# Patient Record
Sex: MALE | Race: WHITE | NOT HISPANIC OR LATINO | Employment: FULL TIME | URBAN - METROPOLITAN AREA
[De-identification: names, ages, dates, MRNs, and addresses within clinical notes are randomized per-mention and may not be internally consistent; named-entity substitution may affect disease eponyms.]

---

## 2020-09-30 ENCOUNTER — OFFICE VISIT (OUTPATIENT)
Dept: DERMATOLOGY | Facility: CLINIC | Age: 25
End: 2020-09-30
Payer: COMMERCIAL

## 2020-09-30 DIAGNOSIS — D22.9 MULTIPLE NEVI: ICD-10-CM

## 2020-09-30 DIAGNOSIS — Z12.83 SKIN EXAM FOR MALIGNANT NEOPLASM: Primary | ICD-10-CM

## 2020-09-30 DIAGNOSIS — D18.01 CHERRY ANGIOMA: ICD-10-CM

## 2020-09-30 DIAGNOSIS — L81.2 FRECKLES: ICD-10-CM

## 2020-09-30 RX ORDER — CAFFEINE 200 MG
200 TABLET ORAL PRN
COMMUNITY
End: 2023-05-08

## 2020-09-30 ASSESSMENT — PAIN SCALES - GENERAL: PAINLEVEL: NO PAIN (0)

## 2020-09-30 NOTE — LETTER
9/30/2020       RE: [address]  [id number]  [facility] [address]     Dear Colleague,    Thank you for referring your patient, Shoaib Sidhu, to the [facility] DERMATOLOGY at [facility]. Please see a copy of my visit note below.    [facility] Dermatology Note    Dermatology Problem List:  1. Multiple benign nevi.  - Photo 9/30/2020     Encounter Date: Sep 30, 2020    CC:   Chief Complaint   Patient presents with     Skin Check     Shoaib is here today for a skin check- Shoaib notes some areas of concern.          History of Present Illness:  Mr. Shoaib Sidhu is a 25 year old male with past dermatologic history listed above who presents in self referral for further evaluation of two spots. Spot on his right shoulder was first noticed a week ago when it had a red rim. Thought to be due to playing Pandorama. Denies any pain, pruritus, or bleeding. The other spot is on his mid central back. Been present for a long time. Denies any pain, pruritus, or bleeding    Of note, works at [employer]). Denies any personal history of skin cancer. Believes that his grandmother had one. Wears sunscreen better than as child. Denies any immunosuppression or tanning bed use.     Otherwise feeling well, no additional skin concerns.     Past Medical History:   There is no problem list on file for this patient.    History reviewed. No pertinent past medical history.  History reviewed. No pertinent surgical history.    Social History:  Patient  reports that he has never smoked. He has never used smokeless tobacco.    Family History:  Family History   Problem Relation Age of Onset     Skin Cancer Paternal Grandmother        Medications:  Current Outpatient Medications   Medication Sig Dispense Refill     caffeine (NO-DOZE) 200 MG TABS tablet Take 200 mg by mouth as needed          No Known Allergies    Review of Systems:  - As per HPI  -  Constitutional: Otherwise feeling well today, in usual state of health.  - Skin: As above in HPI. No additional skin concerns.    Physical exam:  GEN: This is a well developed, well-nourished male in no acute distress, in a pleasant mood.    SKIN: Total skin, which includes the head/face, both arms, chest, back, abdomen,both legs,  digits and/or nails, was examined.  - Reynold I.  - There are dome shaped bright red papules on the right posterior shoulder, arms, and chest.   - 7mm, regular reddish-brown macule on the mid central back. Dermoscopy reassuring.  - Scattered brown macules on sun exposed areas.  - No other lesions of concern on areas examined.         In office labs or procedures performed today:   None    Impression/Plan:  1. Multiple clinically banal appearing nevi  2. Freckles  - ABCDs of melanoma were discussed and self skin checks were advised. Sun precaution was advised including the use of sun screens of SPF 30 or higher, sun protective clothing, and avoidance of tanning beds.  - Took photo of larger nevus on back which appears very reassuring and uniform today    3. Cherry angioma(s). Discussed the natural history and benign nature of this lesion. Reassurance provided that no additional treatment is necessary.     Follow-up in 1-2 years, earlier for new or changing lesions.     Staff Involved:  Patient was seen and staffed with attending physician Dr. Mauricio Chinchilla MD  Med/Derm Resident PGY-3  P:[phone]    Staff Addendum:  Staff Physician Comments:   I saw and evaluated the patient with the resident and I agree with the assessment and plan.  I was present for the examination.    Paulette Martínez MD    Department of Dermatology  [facility]: Phone: [phone], Fax: [phone]  9/30/2020     Again, thank you for allowing me to participate in the care of your patient.       Sincerely,    Paulette Martínez MD

## 2020-09-30 NOTE — NURSING NOTE
Dermatology Rooming Note    Shoaib Sidhu's goals for this visit include:   Chief Complaint   Patient presents with     Skin Check     Shoaib is here today for a skin check- Shoaib notes some areas of concern.      BOSTON Nogueira

## 2020-09-30 NOTE — PROGRESS NOTES
[facility] Dermatology Note    Dermatology Problem List:  1. Multiple benign nevi.  - Photo 9/30/2020     Encounter Date: Sep 30, 2020    CC:   Chief Complaint   Patient presents with     Skin Check     Shoaib is here today for a skin check- Shoaib notes some areas of concern.          History of Present Illness:  Mr. Shoaib Sidhu is a 25 year old male with past dermatologic history listed above who presents in self referral for further evaluation of two spots. Spot on his right shoulder was first noticed a week ago when it had a red rim. Thought to be due to playing [employer]. Denies any pain, pruritus, or bleeding. The other spot is on his mid central back. Been present for a long time. Denies any pain, pruritus, or bleeding    Of note, works at [employer]). Denies any personal history of skin cancer. Believes that his grandmother had one. Wears sunscreen better than as child. Denies any immunosuppression or tanning bed use.     Otherwise feeling well, no additional skin concerns.     Past Medical History:   There is no problem list on file for this patient.    History reviewed. No pertinent past medical history.  History reviewed. No pertinent surgical history.    Social History:  Patient  reports that he has never smoked. He has never used smokeless tobacco.    Family History:  Family History   Problem Relation Age of Onset     Skin Cancer Paternal Grandmother        Medications:  Current Outpatient Medications   Medication Sig Dispense Refill     caffeine (NO-DOZE) 200 MG TABS tablet Take 200 mg by mouth as needed          No Known Allergies    Review of Systems:  - As per HPI  - Constitutional: Otherwise feeling well today, in usual state of health.  - Skin: As above in HPI. No additional skin concerns.    Physical exam:  GEN: This is a well developed, well-nourished male in no acute distress, in a pleasant mood.    SKIN: Total skin, which includes the head/face, both  arms, chest, back, abdomen,both legs,  digits and/or nails, was examined.  - Reynold I.  - There are dome shaped bright red papules on the right posterior shoulder, arms, and chest.   - 7mm, regular reddish-brown macule on the mid central back. Dermoscopy reassuring.  - Scattered brown macules on sun exposed areas.  - No other lesions of concern on areas examined.         In office labs or procedures performed today:   None    Impression/Plan:  1. Multiple clinically banal appearing nevi  2. Freckles  - ABCDs of melanoma were discussed and self skin checks were advised. Sun precaution was advised including the use of sun screens of SPF 30 or higher, sun protective clothing, and avoidance of tanning beds.  - Took photo of larger nevus on back which appears very reassuring and uniform today    3. Cherry angioma(s). Discussed the natural history and benign nature of this lesion. Reassurance provided that no additional treatment is necessary.     Follow-up in 1-2 years, earlier for new or changing lesions.     Staff Involved:  Patient was seen and staffed with attending physician Dr. Mauricio Chinchilla MD  Med/Derm Resident PGY-3  P:[phone]    Staff Addendum:  Staff Physician Comments:   I saw and evaluated the patient with the resident and I agree with the assessment and plan.  I was present for the examination.    Paulette Martínez MD    Department of Dermatology  [facility] [place]: Phone: [phone], Fax: [phone]  9/30/2020

## 2020-09-30 NOTE — LETTER
Date:October 3, 2020      Patient was self referred, no letter generated. Do not send.        [facility] Health Information

## 2020-09-30 NOTE — PATIENT INSTRUCTIONS
The ABCDEs of Melanoma  Asymmetry, Border (irregularity), Color (not uniform, changes in color), Diameter (greater than 6 mm which is about the size of a pencil eraser), and Evolving (any changes in preexisting moles)    Skin cancer can develop anywhere on the skin. Ask someone for help when checking your skin, especially in hard to see places. If you notice a mole different from others, or that changes, enlarges, itches, or bleeds (even if it is small), you should see a dermatologist.

## 2023-05-08 ENCOUNTER — OFFICE VISIT (OUTPATIENT)
Dept: FAMILY MEDICINE | Facility: CLINIC | Age: 28
End: 2023-05-08
Payer: COMMERCIAL

## 2023-05-08 VITALS
SYSTOLIC BLOOD PRESSURE: 126 MMHG | WEIGHT: 174 LBS | BODY MASS INDEX: 24.91 KG/M2 | HEIGHT: 70 IN | OXYGEN SATURATION: 98 % | RESPIRATION RATE: 10 BRPM | TEMPERATURE: 98.9 F | DIASTOLIC BLOOD PRESSURE: 83 MMHG | HEART RATE: 67 BPM

## 2023-05-08 DIAGNOSIS — J02.9 SORE THROAT: ICD-10-CM

## 2023-05-08 DIAGNOSIS — Z13.220 LIPID SCREENING: ICD-10-CM

## 2023-05-08 DIAGNOSIS — Z00.00 ANNUAL PHYSICAL EXAM: Primary | ICD-10-CM

## 2023-05-08 DIAGNOSIS — F41.9 ANXIETY: ICD-10-CM

## 2023-05-08 PROBLEM — D17.30 LIPOMA OF SKIN AND SUBCUTANEOUS TISSUE: Status: ACTIVE | Noted: 2023-05-08

## 2023-05-08 LAB
DEPRECATED S PYO AG THROAT QL EIA: NEGATIVE
GROUP A STREP BY PCR: NOT DETECTED

## 2023-05-08 PROCEDURE — 87651 STREP A DNA AMP PROBE: CPT | Performed by: FAMILY MEDICINE

## 2023-05-08 PROCEDURE — 36415 COLL VENOUS BLD VENIPUNCTURE: CPT | Performed by: FAMILY MEDICINE

## 2023-05-08 PROCEDURE — 99213 OFFICE O/P EST LOW 20 MIN: CPT | Mod: 25 | Performed by: FAMILY MEDICINE

## 2023-05-08 PROCEDURE — 99385 PREV VISIT NEW AGE 18-39: CPT | Performed by: FAMILY MEDICINE

## 2023-05-08 PROCEDURE — 80061 LIPID PANEL: CPT | Performed by: FAMILY MEDICINE

## 2023-05-08 ASSESSMENT — PAIN SCALES - GENERAL: PAINLEVEL: MILD PAIN (2)

## 2023-05-08 NOTE — PROGRESS NOTES
"Assessment/Plan    Preventive.  See below orders for screening tests and immunizations.  -denies STI concerns    Health-related anxiety.  Good insight.  -  Patient will consider starting behavioral therapy.  He would like to check insurance coverage prior to committing to this  - patient declined trial of as needed hydroxyzine    Sore throat. Past few days. Girlfriend w/ similar symptoms.  -Strep testing    F/u annually.    Orders Placed This Encounter   Procedures     Lipid panel reflex to direct LDL Non-fasting     ----  Chief complaint: Establish Care, Anxiety, and Physical    Social History     Social History Narrative    Updated 5/9/2023: [place]. Lives with girlfriend, Johana. 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     Anxiety.  Patient first noticed this in college.  At that time, anxiety was mainly regarding academic performance.  Recently, anxiety has mainly been regarding health concerns.  This is an intermittent issue.  For example, patient will have periods where he becomes concerned that he has a serious illness, such as Parkinson's disease.  During these times, he becomes very worried and has difficulty with sleep.    Exam  /83 (BP Location: Right arm, Patient Position: Sitting, Cuff Size: Adult Regular)   Pulse 67   Temp 98.9  F (37.2  C) (Temporal)   Resp 10   Ht 1.787 m (5' 10.35\")   Wt 78.9 kg (174 lb)   SpO2 98%   BMI 24.72 kg/m       Heart with regular rate and rhythm, no murmurs.  Lung fields clear to auscultation bilaterally.    Oropharynx without abnormal masses.  No cervical adenopathy.    Gait normal.  No resting tremor of hands.  Handwriting legible.  "

## 2023-05-09 LAB
CHOLEST SERPL-MCNC: 149 MG/DL
HDLC SERPL-MCNC: 48 MG/DL
LDLC SERPL CALC-MCNC: 72 MG/DL
NONHDLC SERPL-MCNC: 101 MG/DL
TRIGL SERPL-MCNC: 143 MG/DL

## 2023-10-02 ENCOUNTER — NURSE TRIAGE (OUTPATIENT)
Dept: NURSING | Facility: CLINIC | Age: 28
End: 2023-10-02
Payer: COMMERCIAL

## 2023-10-02 NOTE — TELEPHONE ENCOUNTER
Patient calling. He is having shortness of breath and a burning pain in his chest/lungs which happens most often when he lays down. The pain starts in the upper part of his lungs. No history of asthma. He has recently moved into an older house 3 weeks ago. It was tested for mold and was found clean. This started approximately a week after he moved in. No URI symptoms, no nausea/vomiting/diarrhea. Discomfort increases with inhalation. Sats 98% on room air per patient's AppleWatch, HR is 73.    Patient becomes short of breath, he thinks due to congestion in his lungs, it doesn't hurt to breathe, but he noticed that he was running out of air faster than normal in conversations.     Patient reports that in the morning, he had an increased amount of mucus in his airway.     Care advice given for patient to be seen within 2 weeks. Call transferred to scheduling to assist patient to make an appointment. Patient was also told that if he wished to go to urgent care, that he was welcome to do so, but not to cancel any appointment he made unless told to cancel the appointment by the urgent care provider.     Cordelia Rouse RN  [facility]  October 2, 2023, 5:53 PM      Reason for Disposition   [1] MILD longstanding difficulty breathing AND [2]  SAME as normal    Additional Information   Negative: SEVERE difficulty breathing (e.g., struggling for each breath, speaks in single words)   Negative: [1] Breathing stopped AND [2] hasn't returned   Negative: Choking on something   Negative: Bluish (or gray) lips or face now   Negative: Difficult to awaken or acting confused (e.g., disoriented, slurred speech)   Negative: Passed out (i.e., lost consciousness, collapsed and was not responding)   Negative: Wheezing started suddenly after medicine, an allergic food or bee sting   Negative: Stridor (harsh sound while breathing in)   Negative: Slow, shallow and weak breathing   Negative: Sounds like a life-threatening emergency  "to the triager   Negative: Chest pain   Negative: [1] Wheezing (high pitched whistling sound) AND [2] previous asthma attacks or use of asthma medicines   Negative: [1] Difficulty breathing AND [2] only present when coughing   Negative: [1] Difficulty breathing AND [2] only from stuffy or runny nose   Negative: [1] Difficulty breathing AND [2] within 14 days of COVID-19 Exposure   Negative: [1] MODERATE difficulty breathing (e.g., speaks in phrases, SOB even at rest, pulse 100-120) AND [2] NEW-onset or WORSE than normal   Negative: Wheezing can be heard across the room   Negative: Oxygen level (e.g., pulse oximetry) 90 percent or lower   Negative: Drooling or spitting out saliva (because can't swallow)   Negative: History of prior \"blood clot\" in leg or lungs (i.e., deep vein thrombosis, pulmonary embolism)   Negative: History of inherited increased risk of blood clots (e.g., Factor 5 Leiden, Anti-thrombin 3, Protein C or Protein S deficiency, Prothrombin mutation)   Negative: Major surgery in the past month   Negative: Hip or leg fracture (broken bone) in past month (or had cast on leg or ankle in past month)   Negative: Illness requiring prolonged bedrest in past month (e.g., immobilization, long hospital stay)   Negative: Long-distance travel in past month (e.g., car, bus, train, plane; with trip lasting 6 or more hours)   Negative: Cancer treatment in past six months (or has cancer now)   Negative: Extra heartbeats, irregular heart beating, or heart is beating very fast  (i.e., \"palpitations\")   Negative: Fever > 103 F (39.4 C)   Negative: [1] Fever > 101 F (38.3 C) AND [2] age > 60 years   Negative: [1] Fever > 100.0 F (37.8 C) AND [2] bedridden (e.g., CVA, chronic illness, recovering from surgery)   Negative: [1] Fever > 100.0 F (37.8 C) AND [2] diabetes mellitus or weak immune system (e.g., HIV positive, cancer chemo, splenectomy, organ transplant, chronic steroids)   Negative: [1] Periods where breathing " stops and then resumes normally AND [2] bedridden (e.g., CVA)   Negative: Pregnant or postpartum (from 0 to 6 weeks after delivery)   Negative: Patient sounds very sick or weak to the triager   Negative: [1] MILD difficulty breathing (e.g., minimal/no SOB at rest, SOB with walking, pulse <100) AND [2] NEW-onset or WORSE than normal   Negative: [1] Longstanding difficulty breathing (e.g., CHF, COPD, emphysema) AND [2] WORSE than normal   Negative: [1] Longstanding difficulty breathing AND [2] not responding to usual therapy   Negative: Continuous (nonstop) coughing interferes with work, school, or sleeping   Negative: Oxygen level (e.g., pulse oximetry) 91 to 94 percent   Negative: [1] MODERATE longstanding difficulty breathing (e.g., speaks in phrases, SOB even at rest, pulse 100-120) AND [2] SAME as normal    Protocols used: Breathing Difficulty-A-AH

## 2023-10-03 ENCOUNTER — ANCILLARY PROCEDURE (OUTPATIENT)
Dept: GENERAL RADIOLOGY | Facility: CLINIC | Age: 28
End: 2023-10-03
Attending: PHYSICIAN ASSISTANT
Payer: COMMERCIAL

## 2023-10-03 ENCOUNTER — OFFICE VISIT (OUTPATIENT)
Dept: FAMILY MEDICINE | Facility: CLINIC | Age: 28
End: 2023-10-03
Payer: COMMERCIAL

## 2023-10-03 VITALS
HEIGHT: 70 IN | TEMPERATURE: 97.9 F | SYSTOLIC BLOOD PRESSURE: 133 MMHG | HEART RATE: 72 BPM | WEIGHT: 175.2 LBS | BODY MASS INDEX: 25.08 KG/M2 | DIASTOLIC BLOOD PRESSURE: 88 MMHG | OXYGEN SATURATION: 96 %

## 2023-10-03 DIAGNOSIS — Z23 NEED FOR PROPHYLACTIC VACCINATION AND INOCULATION AGAINST INFLUENZA: ICD-10-CM

## 2023-10-03 DIAGNOSIS — R07.89 ATYPICAL CHEST PAIN: Primary | ICD-10-CM

## 2023-10-03 PROCEDURE — 91320 SARSCV2 VAC 30MCG TRS-SUC IM: CPT | Performed by: PHYSICIAN ASSISTANT

## 2023-10-03 PROCEDURE — 71046 X-RAY EXAM CHEST 2 VIEWS: CPT | Mod: TC | Performed by: RADIOLOGY

## 2023-10-03 PROCEDURE — 93000 ELECTROCARDIOGRAM COMPLETE: CPT | Performed by: PHYSICIAN ASSISTANT

## 2023-10-03 PROCEDURE — 90480 ADMN SARSCOV2 VAC 1/ONLY CMP: CPT | Performed by: PHYSICIAN ASSISTANT

## 2023-10-03 PROCEDURE — 90471 IMMUNIZATION ADMIN: CPT | Performed by: PHYSICIAN ASSISTANT

## 2023-10-03 PROCEDURE — 99213 OFFICE O/P EST LOW 20 MIN: CPT | Mod: 25 | Performed by: PHYSICIAN ASSISTANT

## 2023-10-03 PROCEDURE — 90686 IIV4 VACC NO PRSV 0.5 ML IM: CPT | Performed by: PHYSICIAN ASSISTANT

## 2023-10-03 ASSESSMENT — PAIN SCALES - GENERAL: PAINLEVEL: MILD PAIN (3)

## 2023-10-03 NOTE — RESULT ENCOUNTER NOTE
Kevin Cramer,    Here are your chest x-ray results which are normal.     Please let us know if you have any questions or concerns.    Regards,  Irene Cortez PA-C

## 2023-10-03 NOTE — PROGRESS NOTES
"Assessment and Plan:     (R07.89) Atypical chest pain  (primary encounter diagnosis)  Comment: Describes burning pain in his upper chest  x 1.5 weeks, nonexertional, was able to run/walk about 3-4 miles yesterday without symptoms, no shortness of breath, he is not a smoker or vaper, no family Hx SCD, he is PERC negative, notes that he googled his symptoms and was concerned about mesothelioma  Plan: XR Chest 2 Views, EKG 12-lead complete w/read -        Clinics  Try prilosec x 4-6 weeks and follow-up with pcp  Avoid etoh, spicy/acidic foods  Discussed when to be seen promptly    (Z23) Need for prophylactic vaccination and inoculation against influenza  Comment:   Plan:       Irene Cortez PA-C      Subjective   Shoaib is a 28 year old, presenting for the following health issues:  Breathing Problem      History of Present Illness       Reason for visit:  Lung pain  Symptom onset:  1-2 weeks ago  Symptoms include:  Painful burning sensation in lungs  Symptom intensity:  Moderate  Symptom progression:  Worsening  Had these symptoms before:  No  What makes it worse:  Laying face down  What makes it better:  Laging on my back    He eats 0-1 servings of fruits and vegetables daily.He consumes 1 sweetened beverage(s) daily.He exercises with enough effort to increase his heart rate 10 to 19 minutes per day.  He exercises with enough effort to increase his heart rate 3 or less days per week.   He is taking medications regularly.     Shoaib is here for chest pain which he refers to as \"lung pain\"  It started about a week ago  The pain is burning in nature and is bilateral upper chest   He denies associated nausea/diaphoresis   He notices it more with lying flat  It improves when he uses 2-3 pillows  He went for about a 3-4 mile run last night w/out symptoms   The pain woke him up last night  He is uncomfortable lying on his side to sleep and has to sleep on his back  He has also noticed that he seemed to get more winded " "with talking yesterday  He hasn't noticed sob with exercising  He denies that the pain is exertional   He googled his symptoms and is concerned he has mesothelioma     He has noticed some gerd symptoms--more belching recently, acid in back of throat     He hasn't tried anyting for the pain    He denies personal history of CAD  His uncle had some heart problems in his 40s    His parents are alive and well and in their late 50s, no CAD history   He denies FHx of sudden cardiac death    He does not vape or smoke   He denies recent surgery/hospitalization  He traveled to [place] about a month  He denies hemoptysis, leg swelling or history of DVT/PE    He also notes a mass in his right arm that he noticed two years ago and hasn't changed in size, it is just proximal to right elbow    Review of Systems   See above      Objective      /88 (BP Location: Left arm, Patient Position: Sitting, Cuff Size: Adult Regular)   Pulse 72   Temp 97.9  F (36.6  C) (Temporal)   Ht 1.787 m (5' 10.35\")   Wt 79.5 kg (175 lb 3.2 oz)   SpO2 96%   BMI 24.89 kg/m        Physical Exam     GENERAL: healthy, alert and no distress  ENT: mmm, op clear   RESP: lungs clear to auscultation - no rales, no rhonchi, no wheezes  CV: regular rates and rhythm, normal S1 S2, no S3 or S4 and no murmur, no click or rub   ABD: soft, NT, +BS  MS: extremities- no gross deformities noted, no edema  SKIN: no suspicious lesions, no rashes        "

## 2023-10-03 NOTE — RESULT ENCOUNTER NOTE
Hi Shoaib    Your EKG was normal.    Please let us know if you have any questions or concerns.    Regards,  Irene Cortez PA-C

## 2023-10-03 NOTE — PATIENT INSTRUCTIONS
Try prilosec 20mg daily for 4-6 weeks then see your doctor     Avoid alcohol, excessive coffee, acidic foods

## 2024-04-02 PROCEDURE — 99284 EMERGENCY DEPT VISIT MOD MDM: CPT | Performed by: STUDENT IN AN ORGANIZED HEALTH CARE EDUCATION/TRAINING PROGRAM

## 2024-04-02 PROCEDURE — 99284 EMERGENCY DEPT VISIT MOD MDM: CPT | Mod: 25 | Performed by: STUDENT IN AN ORGANIZED HEALTH CARE EDUCATION/TRAINING PROGRAM

## 2024-04-02 ASSESSMENT — COLUMBIA-SUICIDE SEVERITY RATING SCALE - C-SSRS
6. HAVE YOU EVER DONE ANYTHING, STARTED TO DO ANYTHING, OR PREPARED TO DO ANYTHING TO END YOUR LIFE?: NO
1. IN THE PAST MONTH, HAVE YOU WISHED YOU WERE DEAD OR WISHED YOU COULD GO TO SLEEP AND NOT WAKE UP?: NO
2. HAVE YOU ACTUALLY HAD ANY THOUGHTS OF KILLING YOURSELF IN THE PAST MONTH?: NO

## 2024-04-03 ENCOUNTER — HOSPITAL ENCOUNTER (EMERGENCY)
Facility: CLINIC | Age: 29
Discharge: HOME | End: 2024-04-03
Attending: STUDENT IN AN ORGANIZED HEALTH CARE EDUCATION/TRAINING PROGRAM | Admitting: STUDENT IN AN ORGANIZED HEALTH CARE EDUCATION/TRAINING PROGRAM
Payer: MEDICAID

## 2024-04-03 VITALS
OXYGEN SATURATION: 100 % | RESPIRATION RATE: 20 BRPM | BODY MASS INDEX: 24.5 KG/M2 | SYSTOLIC BLOOD PRESSURE: 119 MMHG | DIASTOLIC BLOOD PRESSURE: 67 MMHG | HEART RATE: 79 BPM | TEMPERATURE: 98 F | HEIGHT: 71 IN | WEIGHT: 175 LBS

## 2024-04-03 DIAGNOSIS — R11.2 NAUSEA AND VOMITING, UNSPECIFIED VOMITING TYPE: ICD-10-CM

## 2024-04-03 LAB
ALBUMIN SERPL BCG-MCNC: 5.2 G/DL (ref 3.5–5.2)
ALP SERPL-CCNC: 68 U/L (ref 40–150)
ALT SERPL W P-5'-P-CCNC: 69 U/L (ref 0–70)
ANION GAP SERPL CALCULATED.3IONS-SCNC: 15 MMOL/L (ref 7–15)
AST SERPL W P-5'-P-CCNC: 32 U/L (ref 0–45)
BASOPHILS # BLD AUTO: 0.1 10E3/UL (ref 0–0.2)
BASOPHILS NFR BLD AUTO: 0 %
BILIRUB SERPL-MCNC: 0.6 MG/DL
BUN SERPL-MCNC: 16.3 MG/DL (ref 6–20)
CALCIUM SERPL-MCNC: 10.3 MG/DL (ref 8.6–10)
CHLORIDE SERPL-SCNC: 102 MMOL/L (ref 98–107)
CREAT SERPL-MCNC: 0.99 MG/DL (ref 0.67–1.17)
DEPRECATED HCO3 PLAS-SCNC: 24 MMOL/L (ref 22–29)
EGFRCR SERPLBLD CKD-EPI 2021: >90 ML/MIN/1.73M2
EOSINOPHIL # BLD AUTO: 0 10E3/UL (ref 0–0.7)
EOSINOPHIL NFR BLD AUTO: 0 %
ERYTHROCYTE [DISTWIDTH] IN BLOOD BY AUTOMATED COUNT: 12.6 % (ref 10–15)
GLUCOSE SERPL-MCNC: 172 MG/DL (ref 70–99)
HCT VFR BLD AUTO: 49.6 % (ref 40–53)
HGB BLD-MCNC: 17.1 G/DL (ref 13.3–17.7)
IMM GRANULOCYTES # BLD: 0.1 10E3/UL
IMM GRANULOCYTES NFR BLD: 1 %
LIPASE SERPL-CCNC: 25 U/L (ref 13–60)
LYMPHOCYTES # BLD AUTO: 0.8 10E3/UL (ref 0.8–5.3)
LYMPHOCYTES NFR BLD AUTO: 6 %
MCH RBC QN AUTO: 28.6 PG (ref 26.5–33)
MCHC RBC AUTO-ENTMCNC: 34.5 G/DL (ref 31.5–36.5)
MCV RBC AUTO: 83 FL (ref 78–100)
MONOCYTES # BLD AUTO: 0.5 10E3/UL (ref 0–1.3)
MONOCYTES NFR BLD AUTO: 4 %
NEUTROPHILS # BLD AUTO: 12.1 10E3/UL (ref 1.6–8.3)
NEUTROPHILS NFR BLD AUTO: 89 %
NRBC # BLD AUTO: 0 10E3/UL
NRBC BLD AUTO-RTO: 0 /100
PLATELET # BLD AUTO: 171 10E3/UL (ref 150–450)
POTASSIUM SERPL-SCNC: 4.2 MMOL/L (ref 3.4–5.3)
PROT SERPL-MCNC: 8.1 G/DL (ref 6.4–8.3)
RBC # BLD AUTO: 5.97 10E6/UL (ref 4.4–5.9)
SODIUM SERPL-SCNC: 141 MMOL/L (ref 135–145)
WBC # BLD AUTO: 13.6 10E3/UL (ref 4–11)

## 2024-04-03 PROCEDURE — 85025 COMPLETE CBC W/AUTO DIFF WBC: CPT | Performed by: STUDENT IN AN ORGANIZED HEALTH CARE EDUCATION/TRAINING PROGRAM

## 2024-04-03 PROCEDURE — 96374 THER/PROPH/DIAG INJ IV PUSH: CPT | Performed by: STUDENT IN AN ORGANIZED HEALTH CARE EDUCATION/TRAINING PROGRAM

## 2024-04-03 PROCEDURE — 36415 COLL VENOUS BLD VENIPUNCTURE: CPT | Performed by: STUDENT IN AN ORGANIZED HEALTH CARE EDUCATION/TRAINING PROGRAM

## 2024-04-03 PROCEDURE — 258N000003 HC RX IP 258 OP 636: Performed by: STUDENT IN AN ORGANIZED HEALTH CARE EDUCATION/TRAINING PROGRAM

## 2024-04-03 PROCEDURE — 250N000011 HC RX IP 250 OP 636: Mod: JZ | Performed by: STUDENT IN AN ORGANIZED HEALTH CARE EDUCATION/TRAINING PROGRAM

## 2024-04-03 PROCEDURE — 83690 ASSAY OF LIPASE: CPT | Performed by: STUDENT IN AN ORGANIZED HEALTH CARE EDUCATION/TRAINING PROGRAM

## 2024-04-03 PROCEDURE — 80053 COMPREHEN METABOLIC PANEL: CPT | Performed by: STUDENT IN AN ORGANIZED HEALTH CARE EDUCATION/TRAINING PROGRAM

## 2024-04-03 PROCEDURE — 96361 HYDRATE IV INFUSION ADD-ON: CPT | Performed by: STUDENT IN AN ORGANIZED HEALTH CARE EDUCATION/TRAINING PROGRAM

## 2024-04-03 RX ORDER — ONDANSETRON 4 MG/1
4 TABLET, ORALLY DISINTEGRATING ORAL EVERY 8 HOURS PRN
Qty: 10 TABLET | Refills: 0 | Status: SHIPPED | OUTPATIENT
Start: 2024-04-03

## 2024-04-03 RX ORDER — HALOPERIDOL 5 MG/ML
2 INJECTION INTRAMUSCULAR ONCE
Status: COMPLETED | OUTPATIENT
Start: 2024-04-03 | End: 2024-04-03

## 2024-04-03 RX ADMIN — HALOPERIDOL LACTATE 2 MG: 5 INJECTION, SOLUTION INTRAMUSCULAR at 00:55

## 2024-04-03 RX ADMIN — SODIUM CHLORIDE 1000 ML: 9 INJECTION, SOLUTION INTRAVENOUS at 00:55

## 2024-04-03 ASSESSMENT — ACTIVITIES OF DAILY LIVING (ADL)
ADLS_ACUITY_SCORE: 35
ADLS_ACUITY_SCORE: 35

## 2024-04-03 NOTE — ED TRIAGE NOTES
Pt ambulatory to ED from home accompanied by his partner with C/O abd pain that started at approx 1500 this day followed by nausea/vomiting that started at approx 1900, pt has been unable to hold anything down since onset of vomiting, pt is A/O x 4, afebrile, VSS, denies sick contacts     Triage Assessment (Adult)       Row Name 04/02/24 0251          Triage Assessment    Airway WDL WDL        Respiratory WDL    Respiratory WDL WDL        Skin Circulation/Temperature WDL    Skin Circulation/Temperature WDL WDL        Cardiac WDL    Cardiac WDL WDL        Peripheral/Neurovascular WDL    Peripheral Neurovascular WDL WDL        Cognitive/Neuro/Behavioral WDL    Cognitive/Neuro/Behavioral WDL WDL

## 2024-04-03 NOTE — DISCHARGE INSTRUCTIONS
Here in the emergency room you had normal labs, normal vital signs, and a reassuring physical exam  I am not sure why you are having vomiting but I am sending you home with a small course of an antinausea medication called Zofran  If you have any new or worsening symptoms including but not limited to abdominal pain, fevers, chills, back pain please return immediately to the emergency room  Otherwise please follow-up with your primary care doctor in the next 1 to 2 days.

## 2024-04-03 NOTE — ED PROVIDER NOTES
"    [facility]ERGENCY DE[facility]MENT ([facility])    4/02/24       ED PROVIDER NOTE   History     Chief Complaint   Patient presents with    Nausea & Vomiting     The history is provided by the patient and medical records.     Shoaib Sidhu is a 29 year old male who presents to the ED for evaluation of nausea and vomiting. Patient reports upper abdominal pain since 3 PM and nausea and vomiting every 30 minutes since 7 PM. Patent reports the abdominal pain comes in waves. Patient states a few years ago he had similar symptoms. Patient reports taking a gummy for the past two nights, but not today. Denies blood in stool. Denies daily medications. Denies marijuana use.     Past Medical History  No past medical history on file.  Past Surgical History:   Procedure Laterality Date    MENISCECTOMY Left     WISDOM TOOTH EXTRACTION       No current outpatient medications on file.    No Known Allergies  Family History  Family History   Problem Relation Age of Onset    Skin Cancer Mother     Skin Cancer Paternal Grandmother     Testicular cancer Other     Prostate Cancer No family hx of     Colorectal Cancer No family hx of      Social History   Social History     Tobacco Use    Smoking status: Never    Smokeless tobacco: Never   Vaping Use    Vaping Use: Never used         A medically appropriate review of systems was performed with pertinent positives and negatives noted in the HPI, and all other systems negative.    Physical Exam   BP: 131/82  Pulse: 79  Temp: 97.3  F (36.3  C)  Resp: 18  Height: 180.3 cm (5' 11\")  Weight: 79.4 kg (175 lb)  SpO2: 100 %  Physical Exam  Constitutional:       General: He is not in acute distress.     Appearance: Normal appearance. He is not toxic-appearing.   HENT:      Head: Atraumatic.   Eyes:      General: No scleral icterus.     Conjunctiva/sclera: Conjunctivae normal.   Cardiovascular:      Rate and Rhythm: Normal rate.      Heart sounds: Normal heart sounds.   Pulmonary:      " Effort: Pulmonary effort is normal. No respiratory distress.      Breath sounds: Normal breath sounds.   Abdominal:      General: There is no distension.      Palpations: Abdomen is soft. There is no mass.      Tenderness: There is no abdominal tenderness. There is no guarding or rebound.      Hernia: No hernia is present.   Musculoskeletal:         General: No deformity.      Cervical back: Neck supple.   Skin:     General: Skin is warm.      Capillary Refill: Capillary refill takes less than 2 seconds.   Neurological:      Mental Status: He is alert.           ED Course, Procedures, & Data      Procedures               No results found for any visits on 04/02/24.  Medications - No data to display  Labs Ordered and Resulted from Time of ED Arrival to Time of ED Departure - No data to display  No orders to display          Critical care was not performed.     Medical Decision Making  The patient's presentation was of low complexity (an acute and uncomplicated illness or injury).    The patient's evaluation involved:  an assessment requiring an independent historian (partner)  review of external note(s) from 2 sources (see separate area of note for details)    The patient's management necessitated moderate risk (prescription drug management including medications given in the ED).    Assessment & Plan    Emergency room to be evaluated for nausea and vomiting  Patient denied any abdominal pain  Soft nontender nondistended abdomen no other symptoms  Labs reviewed and interpreted by me significant for only a leukocytosis which can be seen in vomiting.  Otherwise reassuring.  After Haldol patient reports he is ready to go home, and feels better.  At this point suspect cannabis hyperemesis disease been using cannabis with nausea or vomiting, but unclear etiology at this point and final diagnosis is nausea and vomiting.  Patient discharged home with strict return precaution to come back to the emergency room for any new or  worsening symptoms, instructed to follow-up with his primary care doctor in the next 1 to 2 days.    I have reviewed the nursing notes. I have reviewed the findings, diagnosis, plan and need for follow up with the patient.    New Prescriptions    No medications on file       Final diagnoses:   None   IAarti, am serving as a trained medical scribe to document services personally performed by Chester Hoover MD, based on the provider's statements to me.     IChester MD, was physically present and have reviewed and verified the accuracy of this note documented by Aarti Bedolla.     Chester Hoover MD  [facility] EMERGENCY DEPARTMENT  4/2/2024     Chester Hoover MD  04/03/24 0144

## 2024-06-22 ENCOUNTER — HEALTH MAINTENANCE LETTER (OUTPATIENT)
Age: 29
End: 2024-06-22

## 2025-07-12 ENCOUNTER — HEALTH MAINTENANCE LETTER (OUTPATIENT)
Age: 30
End: 2025-07-12